# Patient Record
(demographics unavailable — no encounter records)

---

## 2020-08-24 NOTE — 2DMMODE
[place] [address]
Phone:  [phone] 2 D/M-MODE ECHOCARDIOGRAM     
_______________________________________________________________________________
 
Name:         MITRA WINSTON            Room:                     REG CLI
M.R.#:    [id number]     Account #:     [id number]  
Admission:    20    Attend Phys:   Jessica Avila DO
Discharge:                Date of Birth: 10/27/59  
Date of Service: 20 1153  Report #:      [id number]
        [id number]
_______________________________________________________________________________
THIS REPORT FOR:
 
cc:  Jessica Avila Maggie M. DO Blick, David R. MD [facility]        
                                                                       ~
 
--------------- APPROVED REPORT --------------
 
 
Study performed:  2020 09:59:41
 
EXAM: Comprehensive 2D, Doppler, and color-flow 
Echocardiogram 
 
      BSA:         1.85
HR: 82 bpm BP:          125/70 mmHg 
 
Other Information 
Study Quality: Fair
 
Indications
Dyspnea 
 
2D Dimensions
IVSd:  10.62 (7-11mm) LVOT Diam:  20.13 (18-24mm) 
LVDd:  40.77 mm  
PWd:  10.72 (7-11mm) Ascending Ao:  30.48 (22-36mm)
LVDs:  28.33 (25-40mm) 
Aortic Root:  29.58 mm 
 
Volumes
Left Atrial Volume (Systole) 
    LA ESV Index:  22.70 mL/m2
 
Aortic Valve
AoV Peak Ebenezer.:  1.09 m/s 
AO Peak Gr.:  4.73 mmHg  LVOT Max P.75 mmHg
AO Mean Gr.:  2.82 mmHg  LVOT Mean P.37 mmHg
    LVOT Max V:  0.83 m/s
AO V2 VTI:  23.63 cm  LVOT Mean V:  0.54 m/s
RAFI (VTI):  2.33 cm2  LVOT V1 VTI:  17.25 cm
 
Mitral Valve
    E/A Ratio:  1.09
    MV Decel. Time:  219.63 ms
 
 
[place] [address]
Phone:  [phone]                     2 D/M-MODE ECHOCARDIOGRAM     
_______________________________________________________________________________
 
Name:         MITRA WINSTON            Room:                     REG CLI
M.R.#:    [id number]     Account #:     [id number]  
Admission:    20    Attend Phys:   Jessica Avila DO
Discharge:                Date of Birth: 10/27/59  
Date of Service: 20 1153  Report #:      [id number]
        [id number]
_______________________________________________________________________________
MV E Max Ebenezer.:  0.88 m/s 
MV PHT:  63.69 ms  
MVA (PHT):  3.45 cm2  
 
TDI
E/Lateral E':  14.67 E/Medial E':  12.57
   Medial E' Ebenezer.:  0.07 m/s
   Lateral E' Ebenezer.:  0.06 m/s
 
Pulmonary Valve
PV Peak Ebenezer.:  0.94 m/s PV Peak Gr.:  3.50 mmHg
 
Left Ventricle
The left ventricle is normal size. There is normal LV segmental wall 
motion. There is normal left ventricular wall thickness. Left 
ventricular systolic function is normal. The left ventricular 
ejection fraction is within the normal range. LVEF is 55-60%. The 
left ventricular diastolic function is normal.
 
Right Ventricle
The right ventricle is normal size. The right ventricular systolic 
function is normal.
 
Atria
The left atrium size is normal. The right atrium size is 
normal.
 
Aortic Valve
The aortic valve is normal in structure. No aortic regurgitation is 
present. There is no aortic valvular stenosis.
 
Mitral Valve
The mitral valve is normal in structure. There is no mitral valve 
regurgitation noted. No evidence of mitral valve stenosis.
 
Tricuspid Valve
The tricuspid valve is normal in structure. There is trace tricuspid 
valve regurgitation noted.
 
Pulmonic Valve
Pulmonic valve is not well visualized. There is no pulmonic valvular 
regurgitation.
 
Great Vessels
The aortic root is normal in size. IVC is normal in size and 
collapses >50% with inspiration.
 
 
[place] [address]
Phone:  [phone]                     2 D/M-MODE ECHOCARDIOGRAM     
_______________________________________________________________________________
 
Name:         MITRA WINSTON            Room:                     REG CLI
M.R.#:    [id number]     Account #:     [id number]  
Admission:    20    Attend Phys:   Jessica Avila DO
Discharge:                Date of Birth: 10/27/59  
Date of Service: 20 1153  Report #:      [id number]
        [id number]
_______________________________________________________________________________
 
Pericardium
There is no pericardial effusion.
 
<Conclusion>
Left ventricular systolic function is normal.
The left ventricular ejection fraction is within the normal range.
 
 
 
 
 
 
 
 
 
 
 
 
 
 
 
 
 
 
 
 
 
 
 
 
 
 
 
 
 
 
 
 
 
 
 
 
 
  <ELECTRONICALLY SIGNED>
                                           By: Cullen Mitchell MD, FACC      
  20     1153
D: 20 1153   _____________________________________
T: 20 1153   Cullen Mitchell MD, FAC        /INF